# Patient Record
(demographics unavailable — no encounter records)

---

## 2025-05-13 NOTE — PROCEDURE
[Straight Catheterization] : insertion of a straight catheter [Urinary Frequency] : urinary frequency [Clear] : clear [No Complications] : no complications [Tolerated Well] : the patient tolerated the procedure well

## 2025-05-19 NOTE — PHYSICAL EXAM
[Chaperoned Physical Exam] : A chaperone was present in the examining room during all aspects of the physical examination. [MA] : MA [No Acute Distress] : in no acute distress [Well developed] : well developed [Oriented x3] : oriented to person, place, and time [Normal Memory] : ~T memory was ~L unimpaired [Warm and Dry] : was warm and dry to touch [Normal Gait] : gait was normal [Labia Majora] : were normal [Labia Minora] : were normal [Normal] : no abnormalities [Post Void Residual ____ml] : post void residual was [unfilled] ml [Exam Deferred] : was deferred [Cough] : no cough [FreeTextEntry3] : negative CST

## 2025-05-19 NOTE — HISTORY OF PRESENT ILLNESS
[FreeTextEntry1] : 27yo   PMH: PSH:   Day time voids: *** Nighttime voids: *** SANDRA: *** UUI: *** Urinary urgency: *** Bladder irritants: *** BM: *** Fecal Incontinence: *** Vaginal bulge: ***, ***splinting Prior treatment: ***  Voiding dysfunction: ***incomplete bladder emptying Lower urinary tract/vaginal symptoms: ***UTIs in past year, ***hematuria, ***bladder pain Sexually active: *** Pelvic pain: *** LMP: *** Pap smears: *** Labs and chart reviewed:

## 2025-07-08 NOTE — HISTORY OF PRESENT ILLNESS
[de-identified] : 07/08/2025 Has b/l knee pain after doing alot of walking on a  trip. No injury. Right worse than left. Has long standing multiple joint complaints including shoulders that click as well [FreeTextEntry1] : RT knee [FreeTextEntry5] : RT knee pain

## 2025-07-08 NOTE — ASSESSMENT
[FreeTextEntry1] : Recommend course of Celebrex to reduce the inflammatory pain PT for quad program Suggested Rheumatology consult for the polyarthralgias

## 2025-07-08 NOTE — IMAGING
[Right] : right knee [AP] : anteroposterior [Lateral] : lateral [There are no fractures, subluxations or dislocations. No significant abnormalities are seen] : There are no fractures, subluxations or dislocations. No significant abnormalities are seen